# Patient Record
(demographics unavailable — no encounter records)

---

## 2018-08-11 NOTE — CT
CT ABDOMEN AND PELVIS WITH ORAL AND IV CONTRAST: 

8/11/18

 

HISTORY: 

Right sided flank pain.

 

FINDINGS:  

Comparison is made with exam of 10/16/15. 

 

The lung bases are clear. The patient is post cholecystectomy. Calcified granulomas in the right lobe
 of the liver are again noted. The spleen, pancreas, adrenal glands and right kidney are normal. The 
previously noted tiny calculus in the right kidney is not seen on the current exam. There is a puncta
te calculus in the left kidney. No hydroureteronephrosis seen on either side. No calculi seen in the 
aorta or the urinary bladder. 

 

No free air or lymphadenopathy is seen in the abdomen or pelvis. A tiny amount of free fluid is seen 
in the pelvis. The small bowel loops are not abnormally dilated. The uterus and ovaries are present. 
A normal appearing appendix is noted. 

 

IMPRESSION:  

1.      Tiny nonobstructing left renal calculus.

2.      Small amount of free fluid in the pelvis. 

 

POS: [facility]

## 2018-09-22 NOTE — EKG
Test Reason : ER INDICATION

Blood Pressure : ***/*** mmHG

Vent. Rate : 062 BPM     Atrial Rate : 062 BPM

   P-R Int : 134 ms          QRS Dur : 100 ms

    QT Int : 444 ms       P-R-T Axes : 039 061 -70 degrees

   QTc Int : 450 ms

 

Normal sinus rhythm with sinus arrhythmia

Nonspecific T wave abnormality

Abnormal ECG

 

Confirmed by JAZMINE PLUMMER, KAYDEN ([id number]),  NEW AGUIRRE ([id number]) on 9/22/2018 5:59:25 PM

 

Referred By:             Confirmed By:KAYDEN LUCERO MD

## 2018-10-12 NOTE — RAD
LEFT FOREARM TWO VIEWS:

10/12/18

 

HISTORY: 

Hit in arm with a bowling ball. 

 

There is no signs of fracture or dislocation. 

 

IMPRESSION:  

Negative left forearm. 

 

POS: [facility]

## 2018-11-02 NOTE — CT
NONCONTRAST HEAD CT:

 

Comparison: 8-9-14

 

History: 

Head injury. Pain. Patient hit her head on a metal object at work. Worsening symptoms. 

 

FINDINGS: 

No parenchymal hemorrhage. No extraaxial hematoma. No midline shift. Basilar cisterns are patent. Bra
in volume, age appropriate. Cortical gray white matter differentiation is preserved. 

 

Ventricles and sulci are patent and symmetric. Calvarium is intact. Adequate aeration of the sinuses 
and mastoid air cells. 

 

IMPRESSION: 

No intracranial post traumatic sequalae. 

 

POS: [facility]